# Patient Record
Sex: MALE | Race: WHITE
[De-identification: names, ages, dates, MRNs, and addresses within clinical notes are randomized per-mention and may not be internally consistent; named-entity substitution may affect disease eponyms.]

---

## 2018-10-24 ENCOUNTER — HOSPITAL ENCOUNTER (OUTPATIENT)
Dept: HOSPITAL 61 - PCVCIMAG | Age: 48
Discharge: HOME | End: 2018-10-24
Attending: INTERNAL MEDICINE
Payer: COMMERCIAL

## 2018-10-24 DIAGNOSIS — G45.9: ICD-10-CM

## 2018-10-24 DIAGNOSIS — Z82.49: ICD-10-CM

## 2018-10-24 DIAGNOSIS — G47.30: Primary | ICD-10-CM

## 2018-10-24 PROCEDURE — 93351 STRESS TTE COMPLETE: CPT

## 2018-10-24 PROCEDURE — 93325 DOPPLER ECHO COLOR FLOW MAPG: CPT

## 2018-10-24 NOTE — PCVCIMAG
--------------- APPROVED REPORT --------------





Study performed:  10/24/2018 14:34:21



Exam:  Stress Echocardiogram

Indication: Fam hx of cad, PFO, TIA

Patient Location: Echo lab

Stress Nurse: Melina Phillips RN

Status: routine



Ht: 5 ft 10 in  

HR: 83 bpm      BP: 100/70 mmHg

Rhythm: NSR



Medical History

Medical History: PFO, Tia



Procedure

The patient underwent an Exercise Stress Test using the [place] 

Protocol. Blood pressure, heart rate, and EKG were monitored.

An Echocardiogram was performed by technician in four stages in quad 

fashion.  At peak stress, four selected images were obtained and 

placed side by side with resting images for comparison.



Stress Test Details

Stress Test:  Exercise stress testing was performed using a [place] 

protocol.

HR

Resting HR:            83 bpmMax Heart Rate (APMHR): 173 bpm 

Max HR Achieved:  171 bpmTarget HR (85% APMHR): 147 bpm

% of APMHR:         98

HR response to stress: Normal HR response to stress



BP

Resting BP:  100/70 mmHg

Max BP:       160/84 mmHg



ECG

Resting ECG:  Sinus Rhythm

Stress ECG:     Sinus Rhythm

Recovery ECG: Sinus Rhythm



Clinical

Reason for Termination: Maximal effort

Exercise duration: 12 min sec

Highest Stage Achieved: Stage 5: 5.0 mph at 18% grade. 

Exercise capacity: 13.70 METs

Overall Exercise Capacity for Age: Good



Pre-Stress Echo

The resting Echocardiogram showed normal left ventricular 

contractility with an estimated Ejection Fraction of about 55-60%. 

Normal wall motion in all segments on baseline images.



Post-Stress Echo

The stress Echocardiogram showed normal left ventricular 

contractility with an estimated Ejection Fraction of about 60-65%. 

Normal augmentation of wall motion in all segments on post stress 

images.



Clinical

No clinical or ECG evidence for ischemia.



Conclusion

Clinical Response:  Non-ischemic

Exercise Capacity:  Superior

Stress ECG Response:  Non-ischemic

Stress Echo Images:  Non-ischemic

The left ventricle is normal in size and wall thickness in both the 

rest and stress images.



Other Information

Study Quality: Adequate



&lt;Conclusion&gt;

The left ventricle is normal in size and wall thickness in both the 

rest and stress images.

## 2019-10-30 ENCOUNTER — HOSPITAL ENCOUNTER (OUTPATIENT)
Dept: HOSPITAL 61 - PCVCIMAG | Age: 49
Discharge: HOME | End: 2019-10-30
Attending: INTERNAL MEDICINE
Payer: COMMERCIAL

## 2019-10-30 DIAGNOSIS — G47.30: ICD-10-CM

## 2019-10-30 DIAGNOSIS — Z83.3: ICD-10-CM

## 2019-10-30 DIAGNOSIS — Z82.49: ICD-10-CM

## 2019-10-30 DIAGNOSIS — I37.1: Primary | ICD-10-CM

## 2019-10-30 DIAGNOSIS — Q21.1: ICD-10-CM

## 2019-10-30 DIAGNOSIS — E78.5: ICD-10-CM

## 2019-10-30 DIAGNOSIS — Z72.89: ICD-10-CM

## 2019-10-30 PROCEDURE — 93306 TTE W/DOPPLER COMPLETE: CPT

## 2019-10-31 NOTE — PCVCIMAG
--------------- APPROVED REPORT --------------





Study performed:  10/30/2019 15:57:28



EXAM: Comprehensive 2D, Doppler, and color-flow 

Echocardiogram

[facility] Location: [facility] lab

[facility]



BSA:         2.26

HR: 56 bpmBP:          156/90 mmHg

Rhythm: NSR



Other Information 

Study Quality: Adequate



Risk Factors: 

Cardiac Risk Factors:  Hyperlipidemia



Indications

Hx: PFO seen on TAY post TIA



2D Dimensions

IVSd:  7.75 (7-11mm)LVOT Diam:  23.42 (18-24mm) 

LVDd:  51.03 mm

PWd:  9.43 (7-11mm)Ascending Ao:  33.97 (22-36mm)

LVDs:  31.39 (25-40mm)

Left Atrium:  43.13 (27-40mm)

Aortic Root:  26.83 mm

LV Single Plane 4CH:  59.43 %

LV Single Plane 2CH:  56.76 %

Biplane EF:  58.2 %



Volumes

Left Atrial Volume (Systole)

Single Plane 4CH:  77.68 mLSingle Plane 2CH:  65.36 mL

Biplane LA Volume:  75.00 mLLA ESV Index:  33.00 mL/m2



Aortic Valve

AoV Peak Flavio.:  1.59 m/s

AO Peak Gr.:  10.14 mmHgLVOT Max PG:  3.40 mmHg

LVOT Max V:  0.92 m/s

MÓNICA Vmax: 2.49 cm2



Mitral Valve

E/A Ratio:  1.8

MV Decel. Time:  236.77 ms

MV E Max Flavio.:  0.72 m/s

MV A Flavio.:  0.41 m/s



TDI

E/Lateral E':  5.54E/Medial E':  6.55

Medial E' Flavio.:  0.11 m/s

Lateral E' Flavio.:  0.13 m/s



Pulmonary Valve

PV Peak Flavio.:  1.14 m/sPV Peak Gr.:  5.23 mmHg



Pulmonary Vein

P Vein S:    0.63 m/sP Vein A:  0.34 m/s

P Vein D:   0.54 m/sP Vein A Dur.:  76.1 msec

P Vein S/D Ratio:  1.17



Tricuspid Valve

TR Peak Flavio.:  1.10 m/s

TR Peak Gr.:  4.84 mmHg

TV Vmax:  0.75 m/sPA Pressure:  12.00 mmHg



Shunt Evaluation

QP/QS:  0.97



Left Ventricle

The left ventricle is normal size. There is normal LV segmental wall 

motion. There is normal left ventricular wall thickness. Left 

ventricular systolic function is normal. The left ventricular 

ejection fraction is within the normal range. LVEF is 55-60%.



Right Ventricle

The right ventricle is normal size. The right ventricular systolic 

function is normal.



Atria

The left atrium size is normal. History of a small PFO seen on TAY 

exam. Normal QpQs today, no PFO seen on transthoracic exam. The right 

atrium size is normal.



Aortic Valve

Aortic valve is trileaflet. The aortic valve is normal in structure 

and function. No aortic regurgitation is present. There is no aortic 

valvular stenosis.



Mitral Valve

The mitral valve is normal in structure. There is no mitral valve 

regurgitation noted. No evidence of mitral valve stenosis.



Tricuspid Valve

The tricuspid valve is normal in structure. Trace tricuspid 

regurgitation. No pulmonary hypertension.



Pulmonic Valve

The pulmonary valve is normal in structure. Mild pulmonic 

regurgitation.



Great Vessels

The aortic root is normal in size. The ascending aorta is normal in 

size. Aortic arch is normal in caliber. The inferior vena cava is not 

well visualized.



Pericardium

There is no pericardial effusion. There is no pleural 

effusion.



<Conclusion>

The left ventricle is normal size.

LVEF is 55-60%.

The right ventricle is normal size.

The left atrium size is normal.

History of a small PFO seen on TAY exam. Normal QpQs today, no PFO 

seen on transthoracic exam.

Aortic valve is trileaflet.

The aortic valve is normal in structure and function.

There is no mitral valve regurgitation noted.

Trace tricuspid regurgitation.

No pulmonary hypertension.

The aortic root is normal in size.

There is no pericardial effusion.

## 2020-10-21 ENCOUNTER — HOSPITAL ENCOUNTER (OUTPATIENT)
Dept: HOSPITAL 35 - CAT | Age: 50
End: 2020-10-21
Attending: INTERNAL MEDICINE
Payer: COMMERCIAL

## 2020-10-21 DIAGNOSIS — I25.10: ICD-10-CM

## 2020-10-21 DIAGNOSIS — Z13.6: Primary | ICD-10-CM

## 2020-10-21 DIAGNOSIS — E78.00: ICD-10-CM
